# Patient Record
Sex: FEMALE
[De-identification: names, ages, dates, MRNs, and addresses within clinical notes are randomized per-mention and may not be internally consistent; named-entity substitution may affect disease eponyms.]

---

## 2021-12-01 ENCOUNTER — RX ONLY (RX ONLY)
Age: 21
End: 2021-12-01

## 2022-01-18 ENCOUNTER — RX ONLY (RX ONLY)
Age: 22
End: 2022-01-18

## 2022-02-18 ENCOUNTER — RX ONLY (RX ONLY)
Age: 22
End: 2022-02-18

## 2022-04-28 ENCOUNTER — RX ONLY (RX ONLY)
Age: 22
End: 2022-04-28

## 2022-05-06 ENCOUNTER — APPOINTMENT (OUTPATIENT)
Dept: URBAN - METROPOLITAN AREA CLINIC 191 | Age: 22
Setting detail: DERMATOLOGY
End: 2022-05-06

## 2022-05-06 DIAGNOSIS — L70.0 ACNE VULGARIS: ICD-10-CM

## 2022-05-06 PROCEDURE — OTHER URINE PREGNANCY TEST: OTHER

## 2022-05-06 PROCEDURE — 81025 URINE PREGNANCY TEST: CPT

## 2022-05-27 ENCOUNTER — APPOINTMENT (OUTPATIENT)
Dept: URBAN - METROPOLITAN AREA CLINIC 191 | Age: 22
Setting detail: DERMATOLOGY
End: 2022-05-28

## 2022-05-27 DIAGNOSIS — L70.0 ACNE VULGARIS: ICD-10-CM

## 2022-05-27 DIAGNOSIS — L85.3 XEROSIS CUTIS: ICD-10-CM

## 2022-05-27 PROCEDURE — 99214 OFFICE O/P EST MOD 30 MIN: CPT

## 2022-05-27 PROCEDURE — 81025 URINE PREGNANCY TEST: CPT

## 2022-05-27 PROCEDURE — OTHER ORDER TESTS: OTHER

## 2022-05-27 PROCEDURE — OTHER URINE PREGNANCY TEST: OTHER

## 2022-05-27 PROCEDURE — OTHER ISOTRETINOIN MONITORING: OTHER

## 2022-05-27 PROCEDURE — OTHER REASSURANCE: OTHER

## 2022-05-27 PROCEDURE — OTHER PRESCRIPTION: OTHER

## 2022-05-27 ASSESSMENT — LOCATION DETAILED DESCRIPTION DERM
LOCATION DETAILED: SUPERIOR THORACIC SPINE
LOCATION DETAILED: LEFT MEDIAL SUPERIOR CHEST
LOCATION DETAILED: LEFT CENTRAL MALAR CHEEK
LOCATION DETAILED: INFERIOR MID FOREHEAD
LOCATION DETAILED: MIDDLE STERNUM

## 2022-05-27 ASSESSMENT — LOCATION SIMPLE DESCRIPTION DERM
LOCATION SIMPLE: INFERIOR FOREHEAD
LOCATION SIMPLE: LEFT CHEEK
LOCATION SIMPLE: UPPER BACK
LOCATION SIMPLE: CHEST

## 2022-05-27 ASSESSMENT — LOCATION ZONE DERM
LOCATION ZONE: TRUNK
LOCATION ZONE: FACE

## 2022-05-27 NOTE — PROCEDURE: REASSURANCE
Additional Notes (Optional): Recommend facial and body moisturizers including Cetaphil, Cerave. Lip balms should be applied multiple times throughout the day. Vaseline or Aquaphor should be applied inside nostrils before bed to decrease nosebleeds. Saline sprays for the nose will also help hydrate the nose. Saline eye drops can be used throughout the day for dry eyes.
Detail Level: Zone
Hide Additional Notes?: No

## 2022-05-28 RX ORDER — ISOTRETINOIN 40 MG/1
CAPSULE, LIQUID FILLED ORAL
Qty: 60 | Refills: 0 | Status: ERX | COMMUNITY
Start: 2022-05-27

## 2022-07-01 ENCOUNTER — APPOINTMENT (OUTPATIENT)
Dept: URBAN - METROPOLITAN AREA CLINIC 191 | Age: 22
Setting detail: DERMATOLOGY
End: 2022-07-01

## 2022-07-01 DIAGNOSIS — L70.0 ACNE VULGARIS: ICD-10-CM

## 2022-07-01 PROCEDURE — OTHER PRESCRIPTION: OTHER

## 2022-07-01 PROCEDURE — OTHER ADDITIONAL NOTES: OTHER

## 2022-07-01 PROCEDURE — 81025 URINE PREGNANCY TEST: CPT

## 2022-07-01 PROCEDURE — OTHER URINE PREGNANCY TEST: OTHER

## 2022-07-01 PROCEDURE — 99214 OFFICE O/P EST MOD 30 MIN: CPT

## 2022-07-01 PROCEDURE — OTHER ISOTRETINOIN MONITORING: OTHER

## 2022-07-01 RX ORDER — ISOTRETINOIN 40 MG/1
CAPSULE, LIQUID FILLED ORAL
Qty: 60 | Refills: 0 | Status: ERX | COMMUNITY
Start: 2022-07-01

## 2022-07-01 ASSESSMENT — LOCATION DETAILED DESCRIPTION DERM
LOCATION DETAILED: SUPERIOR THORACIC SPINE
LOCATION DETAILED: MIDDLE STERNUM
LOCATION DETAILED: INFERIOR MID FOREHEAD

## 2022-07-01 ASSESSMENT — LOCATION ZONE DERM
LOCATION ZONE: FACE
LOCATION ZONE: TRUNK

## 2022-07-01 ASSESSMENT — LOCATION SIMPLE DESCRIPTION DERM
LOCATION SIMPLE: UPPER BACK
LOCATION SIMPLE: CHEST
LOCATION SIMPLE: INFERIOR FOREHEAD

## 2022-07-01 NOTE — PROCEDURE: ADDITIONAL NOTES
Additional Notes: Will be receiving next hormone shot with PCP next month\\nDr. Chaka carpenter samples given, advised to decrease use to [facility] Additional Notes: Will be receiving next hormone shot with PCP next month\\nDr. Chaka carpenter samples given, advised to decrease use to [facility]

## 2022-08-02 ENCOUNTER — APPOINTMENT (OUTPATIENT)
Dept: URBAN - METROPOLITAN AREA CLINIC 191 | Age: 22
Setting detail: DERMATOLOGY
End: 2022-08-03

## 2022-08-02 DIAGNOSIS — L70.0 ACNE VULGARIS: ICD-10-CM

## 2022-08-02 DIAGNOSIS — L74.51 PRIMARY FOCAL HYPERHIDROSIS: ICD-10-CM

## 2022-08-02 PROBLEM — L74.512 PRIMARY FOCAL HYPERHIDROSIS, PALMS: Status: ACTIVE | Noted: 2022-08-02

## 2022-08-02 PROCEDURE — OTHER ADDITIONAL NOTES: OTHER

## 2022-08-02 PROCEDURE — 81025 URINE PREGNANCY TEST: CPT

## 2022-08-02 PROCEDURE — OTHER ISOTRETINOIN MONITORING: OTHER

## 2022-08-02 PROCEDURE — OTHER URINE PREGNANCY TEST: OTHER

## 2022-08-02 PROCEDURE — OTHER PRESCRIPTION MEDICATION MANAGEMENT: OTHER

## 2022-08-02 PROCEDURE — OTHER COUNSELING: OTHER

## 2022-08-02 PROCEDURE — 99214 OFFICE O/P EST MOD 30 MIN: CPT

## 2022-08-02 ASSESSMENT — LOCATION ZONE DERM
LOCATION ZONE: TRUNK
LOCATION ZONE: FACE
LOCATION ZONE: HAND

## 2022-08-02 ASSESSMENT — LOCATION DETAILED DESCRIPTION DERM
LOCATION DETAILED: INFERIOR MID FOREHEAD
LOCATION DETAILED: SUPERIOR THORACIC SPINE
LOCATION DETAILED: MIDDLE STERNUM
LOCATION DETAILED: LEFT ULNAR PALM
LOCATION DETAILED: RIGHT RADIAL PALM

## 2022-08-02 ASSESSMENT — LOCATION SIMPLE DESCRIPTION DERM
LOCATION SIMPLE: CHEST
LOCATION SIMPLE: LEFT HAND
LOCATION SIMPLE: INFERIOR FOREHEAD
LOCATION SIMPLE: RIGHT HAND
LOCATION SIMPLE: UPPER BACK

## 2022-08-02 NOTE — PROCEDURE: PRESCRIPTION MEDICATION MANAGEMENT
Render In Strict Bullet Format?: No
Detail Level: Zone
Plan: Will consider starting Robinul at next visit.

## 2022-08-02 NOTE — PROCEDURE: ADDITIONAL NOTES
Detail Level: Simple
Additional Notes: Progesterone shot was 1 week ago.
Render Risk Assessment In Note?: no

## 2022-09-02 ENCOUNTER — APPOINTMENT (OUTPATIENT)
Dept: URBAN - METROPOLITAN AREA CLINIC 191 | Age: 22
Setting detail: DERMATOLOGY
End: 2022-09-02

## 2022-09-02 DIAGNOSIS — L74.51 PRIMARY FOCAL HYPERHIDROSIS: ICD-10-CM

## 2022-09-02 DIAGNOSIS — L70.0 ACNE VULGARIS: ICD-10-CM

## 2022-09-02 PROBLEM — L74.512 PRIMARY FOCAL HYPERHIDROSIS, PALMS: Status: ACTIVE | Noted: 2022-09-02

## 2022-09-02 PROCEDURE — OTHER PRESCRIPTION: OTHER

## 2022-09-02 PROCEDURE — OTHER PRESCRIPTION MEDICATION MANAGEMENT: OTHER

## 2022-09-02 PROCEDURE — 81025 URINE PREGNANCY TEST: CPT

## 2022-09-02 PROCEDURE — OTHER URINE PREGNANCY TEST: OTHER

## 2022-09-02 PROCEDURE — 99214 OFFICE O/P EST MOD 30 MIN: CPT

## 2022-09-02 PROCEDURE — OTHER COUNSELING: OTHER

## 2022-09-02 PROCEDURE — OTHER ISOTRETINOIN MONITORING: OTHER

## 2022-09-02 RX ORDER — ISOTRETINOIN 40 MG/1
CAPSULE, LIQUID FILLED ORAL
Qty: 80 | Refills: 0 | Status: ERX

## 2022-09-02 ASSESSMENT — LOCATION DETAILED DESCRIPTION DERM
LOCATION DETAILED: MIDDLE STERNUM
LOCATION DETAILED: LEFT ULNAR PALM
LOCATION DETAILED: SUPERIOR THORACIC SPINE
LOCATION DETAILED: INFERIOR MID FOREHEAD
LOCATION DETAILED: RIGHT RADIAL PALM

## 2022-09-02 ASSESSMENT — LOCATION SIMPLE DESCRIPTION DERM
LOCATION SIMPLE: INFERIOR FOREHEAD
LOCATION SIMPLE: UPPER BACK
LOCATION SIMPLE: CHEST
LOCATION SIMPLE: RIGHT HAND
LOCATION SIMPLE: LEFT HAND

## 2022-09-02 ASSESSMENT — LOCATION ZONE DERM
LOCATION ZONE: TRUNK
LOCATION ZONE: FACE
LOCATION ZONE: HAND

## 2022-09-02 NOTE — HPI: MEDICATION (ISOTRETINOIN)
Is This A New Presentation, Or A Follow-Up?: Follow Up Isotretinoin
Display Cumulative Dose In The Note?: Yes
Patient Reported Weight In Pounds (Required For Calculation): 170
When Was Isotretinoin Started?: 2/18/22

## 2022-09-02 NOTE — PROCEDURE: ISOTRETINOIN MONITORING
Xerosis Aggressive Treatment: I recommended application of Cetaphil or CeraVe numerous times a day and before going to bed to all dry areas. I also prescribed a topical steroid for twice daily use. MARRY [id number] RN

## 2022-09-02 NOTE — PROCEDURE: PRESCRIPTION MEDICATION MANAGEMENT
Render In Strict Bullet Format?: No
Detail Level: Zone
Plan: Will plan to start treatment after Accutane treatment is completed.

## 2022-10-14 ENCOUNTER — APPOINTMENT (OUTPATIENT)
Dept: URBAN - METROPOLITAN AREA CLINIC 191 | Age: 22
Setting detail: DERMATOLOGY
End: 2022-10-14

## 2022-10-14 ENCOUNTER — APPOINTMENT (OUTPATIENT)
Dept: URBAN - METROPOLITAN AREA CLINIC 191 | Age: 22
Setting detail: DERMATOLOGY
End: 2022-10-17

## 2022-10-14 DIAGNOSIS — L70.0 ACNE VULGARIS: ICD-10-CM

## 2022-10-14 PROCEDURE — OTHER URINE PREGNANCY TEST: OTHER

## 2022-10-14 PROCEDURE — 81025 URINE PREGNANCY TEST: CPT

## 2022-10-14 PROCEDURE — OTHER PRESCRIPTION MEDICATION MANAGEMENT: OTHER

## 2022-10-14 PROCEDURE — OTHER ISOTRETINOIN MONITORING: OTHER

## 2022-10-14 PROCEDURE — 99214 OFFICE O/P EST MOD 30 MIN: CPT

## 2022-10-14 RX ORDER — ISOTRETINOIN 40 MG/1
CAPSULE, LIQUID FILLED ORAL
Qty: 80 | Refills: 0 | Status: CANCELLED
Stop reason: CLARIF

## 2022-10-14 ASSESSMENT — LOCATION DETAILED DESCRIPTION DERM
LOCATION DETAILED: SUPERIOR THORACIC SPINE
LOCATION DETAILED: INFERIOR MID FOREHEAD

## 2022-10-14 ASSESSMENT — LOCATION ZONE DERM
LOCATION ZONE: FACE
LOCATION ZONE: TRUNK

## 2022-10-14 ASSESSMENT — LOCATION SIMPLE DESCRIPTION DERM
LOCATION SIMPLE: UPPER BACK
LOCATION SIMPLE: INFERIOR FOREHEAD

## 2022-10-14 NOTE — PROCEDURE: PRESCRIPTION MEDICATION MANAGEMENT
Plan: Come back in for appointment 40 days from today ( 30 days after last dose which will be in 10 days)\\Marianna may have cosmetic procedures 6 months after last
Samples Given: Eucerin moisturizing cream
Render In Strict Bullet Format?: No
Detail Level: Zone

## 2022-11-29 ENCOUNTER — APPOINTMENT (OUTPATIENT)
Dept: URBAN - METROPOLITAN AREA CLINIC 191 | Age: 22
Setting detail: DERMATOLOGY
End: 2022-11-30

## 2022-11-29 DIAGNOSIS — L70.0 ACNE VULGARIS: ICD-10-CM

## 2022-11-29 PROCEDURE — OTHER PRESCRIPTION MEDICATION MANAGEMENT: OTHER

## 2022-11-29 PROCEDURE — OTHER URINE PREGNANCY TEST: OTHER

## 2022-11-29 PROCEDURE — 81025 URINE PREGNANCY TEST: CPT

## 2022-11-29 NOTE — PROCEDURE: PRESCRIPTION MEDICATION MANAGEMENT
Plan: nurse visit only. 2nd post treatment UPT today.
Render In Strict Bullet Format?: No
Detail Level: Zone